# Patient Record
Sex: FEMALE | Race: BLACK OR AFRICAN AMERICAN | Employment: PART TIME | URBAN - METROPOLITAN AREA
[De-identification: names, ages, dates, MRNs, and addresses within clinical notes are randomized per-mention and may not be internally consistent; named-entity substitution may affect disease eponyms.]

---

## 2021-08-17 PROBLEM — S61.223A: Status: ACTIVE | Noted: 2021-08-17

## 2021-08-18 ENCOUNTER — APPOINTMENT (OUTPATIENT)
Dept: GENERAL RADIOLOGY | Age: 21
End: 2021-08-18
Attending: PLASTIC SURGERY
Payer: COMMERCIAL

## 2021-08-18 ENCOUNTER — HOSPITAL ENCOUNTER (OUTPATIENT)
Age: 21
Setting detail: OUTPATIENT SURGERY
Discharge: HOME | End: 2021-08-18
Attending: PLASTIC SURGERY | Admitting: PLASTIC SURGERY
Payer: COMMERCIAL

## 2021-08-18 VITALS
RESPIRATION RATE: 16 BRPM | HEART RATE: 76 BPM | WEIGHT: 157.4 LBS | BODY MASS INDEX: 31.73 KG/M2 | DIASTOLIC BLOOD PRESSURE: 79 MMHG | SYSTOLIC BLOOD PRESSURE: 113 MMHG | OXYGEN SATURATION: 100 % | TEMPERATURE: 97.6 F | HEIGHT: 59 IN

## 2021-08-18 DIAGNOSIS — G89.18 POST-OP PAIN: Primary | ICD-10-CM

## 2021-08-18 PROCEDURE — 2709999900 HC NON-CHARGEABLE SUPPLY: Performed by: PLASTIC SURGERY

## 2021-08-18 PROCEDURE — 7100000011 HC PHASE II RECOVERY - ADDTL 15 MIN: Performed by: PLASTIC SURGERY

## 2021-08-18 PROCEDURE — 2500000003 HC RX 250 WO HCPCS: Performed by: PLASTIC SURGERY

## 2021-08-18 PROCEDURE — 3600000002 HC SURGERY LEVEL 2 BASE: Performed by: PLASTIC SURGERY

## 2021-08-18 PROCEDURE — 3209999900 FLUORO FOR SURGICAL PROCEDURES

## 2021-08-18 PROCEDURE — 7100000010 HC PHASE II RECOVERY - FIRST 15 MIN: Performed by: PLASTIC SURGERY

## 2021-08-18 PROCEDURE — 3600000012 HC SURGERY LEVEL 2 ADDTL 15MIN: Performed by: PLASTIC SURGERY

## 2021-08-18 RX ORDER — CEPHALEXIN 500 MG/1
500 CAPSULE ORAL 4 TIMES DAILY
COMMUNITY
End: 2021-10-07

## 2021-08-18 RX ORDER — HYDROCODONE BITARTRATE AND ACETAMINOPHEN 5; 325 MG/1; MG/1
1 TABLET ORAL EVERY 4 HOURS PRN
Qty: 42 TABLET | Refills: 0 | Status: SHIPPED | OUTPATIENT
Start: 2021-08-18 | End: 2021-08-25

## 2021-08-18 ASSESSMENT — PAIN DESCRIPTION - DESCRIPTORS: DESCRIPTORS: THROBBING;ACHING;SHARP

## 2021-08-18 ASSESSMENT — PAIN SCALES - GENERAL: PAINLEVEL_OUTOF10: 10

## 2021-08-18 ASSESSMENT — PAIN - FUNCTIONAL ASSESSMENT
PAIN_FUNCTIONAL_ASSESSMENT: PREVENTS OR INTERFERES SOME ACTIVE ACTIVITIES AND ADLS
PAIN_FUNCTIONAL_ASSESSMENT: 0-10

## 2021-08-18 NOTE — PROGRESS NOTES
CLINICAL PHARMACY NOTE: MEDS TO BEDS    Total # of Prescriptions Filled: 1   The following medications were delivered to the patient:  · Norco 5-325 mg tab    Additional Documentation: M2B delivered to the patient.

## 2021-08-18 NOTE — BRIEF OP NOTE
Brief Postoperative Note      Patient: Zelalem Wright  YOB: 2000  MRN: [id number]    Date of Procedure: 8/18/2021    Pre-Op Diagnosis: LACERATION LEFT INDEX AND MIDDLE FINGERS WITH FOREIGN BODY    Post-Op Diagnosis: Same       Procedure(s):  EXPLORATION LEFT MIDDLE FINGER WITH FOREIGN BODY REMOVAL UNDER X-RAY, REPAIR OF EXTENSOR TENDON LEFT INDEX FINGER    Surgeon(s):  Vincent Pastrana MD    Assistant:  * No surgical staff found *    Anesthesia: Local    Estimated Blood Loss (mL): Minimal    Complications: None    Specimens:   * No specimens in log *    Implants:  * No implants in log *      Drains: * No LDAs found *    Findings:     Electronically signed by Vincent Pastrana MD on 8/18/2021 at 1:32 PM

## 2021-08-18 NOTE — H&P
Subjective:      Patient ID: Rachel Easton is a 19 y.o. female.     HPI  Patient is here today for LIF LMF laceration, foreign body in LMF. Pt was at work and was opening a sterile glass container when the container broke. Pt states she was told there is glass in the LMF. Pt complains of pain 7/10 and is taking ibuprofen with positive results. No other concerns at this time.     Review of Systems   Constitutional: Negative. HENT: Negative for congestion and trouble swallowing. Respiratory: Negative for cough and shortness of breath. Cardiovascular: Negative for chest pain. Gastrointestinal: Negative for abdominal pain. Neurological: Negative for light-headedness and headaches. Psychiatric/Behavioral: Negative for dysphoric mood. Medical History    Medical History    Diagnosis Date Comment Source   Anxiety      Asthma         Other Medical History    Diagnosis Date Comment Source   Eczema      Migraines         Family History    Problem Relation Age of Onset Comments   Migraines Mother     Social History    Tobacco History    Smoking Status   Never Smoker   Smokeless Tobacco Use   Never Used   Alcohol History    Alcohol Use Status   Yes   Drug Use    Drug Use Status   Never   Sexual Activity    Sexually Active   Not Asked    Surgical History    No past surgical history on file. E-Cigarettes/Vaping Use    Questions   E-Cigarette/Vaping Use   Start Date   Passive Exposure   Quit Date   Counseling Given   Comments   Socioeconomic History    Employment History    No employment history on file. Family and Education    Marital Status   Single   Social Identity    Preferred Language Ethnicity Race   English Non- / Non  Black /            Outpatient Medications      cephALEXin (KEFLEX) 500 MG capsule Take 500 mg by mouth 4 times daily              Objective:   Physical Exam  Vitals and nursing note reviewed. Exam conducted with a chaperone present.    Constitutional: Appearance: Normal appearance. HENT:      Head: Normocephalic and atraumatic. Mouth/Throat:      Mouth: Mucous membranes are moist.   Eyes:      Extraocular Movements: Extraocular movements intact. Conjunctiva/sclera: Conjunctivae normal.      Pupils: Pupils are equal, round, and reactive to light. Pulmonary:      Effort: Pulmonary effort is normal.   Musculoskeletal:      Comments:   Lacerations left middle finger and index finger. X-ray shows small FB in middle finger adjacent to the distal middle phalanx, appears volar radial.  With the index finger she can flex and extend the DIP but there appears to be extensor lag. There may be injury to the extensor tendon. Skin:     General: Skin is warm and dry. Neurological:      General: No focal deficit present. Mental Status: She is alert and oriented to person, place, and time.          Assessment:   Glass FB left middle finger. Possible extensor tendon injury left index finger. Plan:   Scheduled for exploration to remove FB left middle finger; exploration left index finger with possible extensor tendon repair. I have discussed with the patient the indication, alternatives, and the possible risks and/or complications which include but are not limited to those delineated on the consent form for the planned procedure and the anesthesia methods. All questions were answered to patient's satisfaction. Consent was reviewed and signed.

## 2021-08-19 NOTE — OP NOTE
[facility]                                OPERATIVE REPORT    PATIENT NAME: Gloria Hoffman                     :        2000  MED REC NO:   [id number]                             ROOM:  ACCOUNT NO:   [de-identified]                           ADMIT DATE: 2021  PROVIDER:     Radha Leon    DATE OF PROCEDURE:  2021    PREOPERATIVE DIAGNOSES:  1. Foreign body (glass) left middle finger. 2.  Possible extensor tendon injury left index finger. POSTOPERATIVE DIAGNOSES:  Foreign body (glass) in left middle finger and  transection of extensor tendon left index finger at DIP joint. PROCEDURE:  1. Exploration of left middle finger with removal of glass foreign  body. 2.  Repair of extensor tendon, left index finger. ATTENDING PHYSICIAN AND SURGEON:  Radha Leon MD.    ANESTHESIA:  Local 1% Xylocaine plain, buffered. 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 She presents this time for  exploration and repairs. The procedure, the risks, benefits were  discussed with her. All questions answered to her satisfaction. Consent was signed. NARRATIVE SUMMARY:  The patient was brought to the operating suite,  placed in the supine position. Left arm was under arm board and she was  prepped and draped in the usual sterile fashion. Initial attention was  taken to the middle finger. First using three 25-gauge hypodermic  needles, these were placed as markers, and x-ray was taken with fluoro  thereby localizing where the foreign body was. At this point, an  incision was made over the location where the foreign body was seen,  exploration done with sharp and blunt scissor dissection until a  glistening glass foreign body was found, and it was removed. Bovie  cautery for hemostasis.   Wound closed with interrupted mattress sutures  of 4-0 black nylon. Attention was then taken to the left index finger, and the sutures  placed in the ER were removed. Probing into the wound, it was clear  that the extensor tendon was completely transected at the DIP joint  level. The wound was then extended proximal and distal for exposure,  dissection superficial to the extensor tendon. At this point, the  finger was placed into hyperextension and the tendon was repaired with  interrupted sutures of 3-0 Surgilon. Skin closed with interrupted  mattress sutures of 4-0 black nylon. She was then dressed with Adaptic,  Kerlix roll, and one-step splint for immobilization. The patient  tolerated procedure well. Blood loss minimal.  Specimens none. Complications none. The patient was transferred to Recovery in stable  condition.             Josef Vela    D: 08/19/2021 8:42:21       T: 08/19/2021 8:45:56     [email or web address]  Job#: [id number]     Doc#: [id number]    CC:

## 2021-08-26 PROBLEM — S66.321A LACERATION OF EXTENSOR MUSCLE, FASCIA AND TENDON OF LEFT INDEX FINGER AT WRIST AND HAND LEVEL, INITIAL ENCOUNTER: Status: ACTIVE | Noted: 2021-08-26

## 2021-12-21 PROBLEM — E66.9 OBESITY WITH BODY MASS INDEX 30 OR GREATER: Status: ACTIVE | Noted: 2021-08-14

## 2021-12-21 PROBLEM — G43.909 MIGRAINE: Status: ACTIVE | Noted: 2021-12-21

## 2021-12-21 PROBLEM — F41.9 ANXIETY: Status: ACTIVE | Noted: 2021-06-08

## 2021-12-21 PROBLEM — N94.6 DYSMENORRHEA: Status: ACTIVE | Noted: 2021-12-21

## 2021-12-21 PROBLEM — L02.419 ABSCESS OF AXILLA: Status: ACTIVE | Noted: 2021-08-14

## 2021-12-21 PROBLEM — R10.9 RIGHT FLANK PAIN: Status: ACTIVE | Noted: 2017-07-13

## 2021-12-21 PROBLEM — S83.90XA SPRAIN OF KNEE: Status: ACTIVE | Noted: 2021-12-21

## 2021-12-21 PROBLEM — M25.579 ANKLE PAIN: Status: ACTIVE | Noted: 2021-12-21

## 2021-12-21 PROBLEM — L02.419 CUTANEOUS ABSCESS OF LIMB, UNSPECIFIED: Status: ACTIVE | Noted: 2021-06-15

## 2021-12-21 PROBLEM — H16.009 CORNEAL ULCER: Status: ACTIVE | Noted: 2018-04-14

## 2021-12-21 PROBLEM — M79.641 PAIN IN RIGHT HAND: Status: ACTIVE | Noted: 2021-06-08

## (undated) DEVICE — Z DISCONTINUED BY MEDLINE USE 2711682 TRAY SKIN PREP DRY W/ PREM GLV

## (undated) DEVICE — SUTURE SURGLON 3-0 L18IN NONABSORBABLE WHT P-12 L19MM 3/8 SBS1883G

## (undated) DEVICE — SOLUTION IV IRRIG POUR BRL 0.9% SODIUM CHL 2F7124

## (undated) DEVICE — GLOVE ORANGE PI 7 1/2   MSG9075

## (undated) DEVICE — STRIP,CLOSURE,WOUND,MEDI-STRIP,1/2X4: Brand: MEDLINE

## (undated) DEVICE — DRESSING PETRO W3XL3IN OIL EMUL N ADH GZ KNIT IMPREG CELOS

## (undated) DEVICE — SPLINT THMB W4XL15IN FBRGLS PD PRECUT LTWT DURABLE FAST SET

## (undated) DEVICE — SOLUTION SURG PREP POV IOD 7.5% 4 OZ

## (undated) DEVICE — SUTURE ETHLN SZ 4-0 L18IN NONABSORBABLE BLK L16MM PC-3 3/8 1864G

## (undated) DEVICE — INTENDED FOR TISSUE SEPARATION, AND OTHER PROCEDURES THAT REQUIRE A SHARP SURGICAL BLADE TO PUNCTURE OR CUT.: Brand: BARD-PARKER ® CARBON RIB-BACK BLADES

## (undated) DEVICE — MHPB HAND AND FOOT PACK: Brand: MEDLINE INDUSTRIES, INC.

## (undated) DEVICE — ELECTRODE PT RET AD L9FT HI MOIST COND ADH HYDRGEL CORDED

## (undated) DEVICE — PENCIL ES L3M BTTN SWCH HOLSTER W/ BLDE ELECTRD EDGE